# Patient Record
Sex: FEMALE | Race: WHITE | NOT HISPANIC OR LATINO | Employment: FULL TIME | URBAN - METROPOLITAN AREA
[De-identification: names, ages, dates, MRNs, and addresses within clinical notes are randomized per-mention and may not be internally consistent; named-entity substitution may affect disease eponyms.]

---

## 2017-04-22 ENCOUNTER — HOSPITAL ENCOUNTER (EMERGENCY)
Facility: CLINIC | Age: 27
Discharge: HOME | End: 2017-04-22
Attending: PHYSICIAN ASSISTANT | Admitting: PHYSICIAN ASSISTANT
Payer: COMMERCIAL

## 2017-04-22 ENCOUNTER — APPOINTMENT (OUTPATIENT)
Dept: GENERAL RADIOLOGY | Facility: CLINIC | Age: 27
End: 2017-04-22
Attending: PHYSICIAN ASSISTANT
Payer: COMMERCIAL

## 2017-04-22 VITALS
WEIGHT: 137 LBS | HEART RATE: 99 BPM | SYSTOLIC BLOOD PRESSURE: 110 MMHG | HEIGHT: 65 IN | RESPIRATION RATE: 18 BRPM | DIASTOLIC BLOOD PRESSURE: 70 MMHG | OXYGEN SATURATION: 97 % | BODY MASS INDEX: 22.82 KG/M2 | TEMPERATURE: 98.8 F

## 2017-04-22 DIAGNOSIS — S92.352A CLOSED DISPLACED FRACTURE OF FIFTH METATARSAL BONE OF LEFT FOOT, INITIAL ENCOUNTER: ICD-10-CM

## 2017-04-22 PROCEDURE — 73630 X-RAY EXAM OF FOOT: CPT | Mod: LT

## 2017-04-22 PROCEDURE — 99283 EMERGENCY DEPT VISIT LOW MDM: CPT

## 2017-04-22 PROCEDURE — 25000132 ZZH RX MED GY IP 250 OP 250 PS 637: Performed by: PHYSICIAN ASSISTANT

## 2017-04-22 RX ORDER — HYDROCODONE BITARTRATE AND ACETAMINOPHEN 5; 325 MG/1; MG/1
1-2 TABLET ORAL EVERY 4 HOURS PRN
Qty: 15 TABLET | Refills: 0 | Status: SHIPPED | OUTPATIENT
Start: 2017-04-22 | End: 2022-06-29

## 2017-04-22 RX ORDER — IBUPROFEN 600 MG/1
600 TABLET, FILM COATED ORAL ONCE
Status: COMPLETED | OUTPATIENT
Start: 2017-04-22 | End: 2017-04-22

## 2017-04-22 RX ADMIN — IBUPROFEN 600 MG: 600 TABLET ORAL at 17:02

## 2017-04-22 ASSESSMENT — ENCOUNTER SYMPTOMS: WOUND: 0

## 2017-04-22 NOTE — ED AVS SNAPSHOT
[facility] Emergency Department    [address]    [facility] [address]    Phone:  [phone]    Fax:  [phone]                                       Nicole Horn   MRN: [id number]    Department:  [facility] Emergency Department   Date of Visit:  4/22/2017           After Visit Summary Signature Page     I have received my discharge instructions, and my questions have been answered. I have discussed any challenges I see with this plan with the nurse or doctor.    ..........................................................................................................................................  Patient/Patient Representative Signature      ..........................................................................................................................................  Patient Representative Print Name and Relationship to Patient    ..................................................               ................................................  Date                                            Time    ..........................................................................................................................................  Reviewed by Signature/Title    ...................................................              ..............................................  Date                                                            Time

## 2017-04-22 NOTE — ED PROVIDER NOTES
"  History     Chief Complaint:  Foot Pain      HPI   Nicole Horn is a 26 year old female who presents with foot pain. The patient states yesterday she was painting when she stepped down on her left foot and it rolled. She has had pain in the lateral foot since but has been able to ambulate. She denies other injuries.     Allergies:  Augmentin-rash    Medications:    No medications    Past Medical History:    No past medical history on file.    Past Surgical History:    No past surgical history on file.    Family History:    No family history on file.    Social History:  Patient presents with her children  Marital Status:  Single [1]     Review of Systems   Musculoskeletal:        Left foot pain.   Skin: Negative for wound.   All other systems reviewed and are negative.    Physical Exam   First Vitals:  BP: 110/70  Pulse: 99  Temp: 98.8  F (37.1  C)  Resp: 18  Height: 165.1 cm (5' 5\")  Weight: 62.1 kg (137 lb)  SpO2: 97 %    Physical Exam  Constitutional: Alert, attentive  CV: 2+ DP and PT pulses, brisk distal cap refill  MSK: There is swelling and bruising over the dorsal aspect at the 5th metatarsal with tenderness. No pain with palpation or ROM of the left ankle, knee. No lower leg pain.   Neurological: 5/5 strength to the DF, PF, EHL and FHL motor functions; sensation intact to the DP, SP, T, S and S distributions  Skin: Skin is warm and dry.     Emergency Department Course     Imaging:  Radiographic findings were communicated with the patient who voiced understanding of the findings.  Left foot XR: There is a minimally displaced fracture of the proximal metaphysis of the left fifth metatarsal. No other fractures, per radiology.     Interventions:  Ibuprofen 600 mg PO    Emergency Department Course:  I evaluated the patient and discussed a plan of care with the patient.   Rechecked the patient, findings and plan explained to the patient. Patient discharged home, status improved, with instructions regarding " supportive care, medications, and reasons to return as well as the importance of close follow-up was reviewed.     Impression & Plan      Medical Decision Making:  Nicole Horn is a 26 year old female who presents for evaluation of left foot pain after rolling ankle last night. CMS is intact distally in the extremity.  Xrays reveal a fracture that does not need reduction at this time. They understand that this need for reduction and/or surgery may change with time and orthopedic consultation.  There is no indication for ortho consultation from the ED. Rather, close follow-up is indicated in the next days.  CAM boot and fracture precautions for home.  No other injuries.     This is a non-operative injury and unless further angulation with time or unrelenting pain, definitive fracture care is provided.  Patient was educated on natural course of this fracture, provided pain medication, need for increasing gentle activity (walking around block, increase distance each day). They need to follow up with primary only and only if further symptoms or progressive symptoms will need to seek care again in ED or with specialist.        Diagnosis:    ICD-10-CM    1. Closed displaced fracture of fifth metatarsal bone of left foot, initial encounter S92.352A        Disposition:  discharged to home    Discharge Medications:  New Prescriptions    HYDROCODONE-ACETAMINOPHEN (NORCO) 5-325 MG PER TABLET    Take 1-2 tablets by mouth every 4 hours as needed for moderate to severe pain       Sariah Gayle  4/22/2017   [facility] EMERGENCY DEPARTMENT       Sariah Gayle PA-C  04/22/17 1813

## 2017-04-22 NOTE — ED AVS SNAPSHOT
[facility] Emergency Department    [address]    [facility] [address]    Phone:  [phone]    Fax:  [phone]                                       Nicole Horn   MRN: [id number]    Department:  [facility] Emergency Department   Date of Visit:  4/22/2017           Patient Information     Date Of Birth          1990        Your diagnoses for this visit were:     Closed displaced fracture of fifth metatarsal bone of left foot, initial encounter        You were seen by Sariah Gayle PA-C.      Follow-up Information     Schedule an appointment as [facility] as [facility]    Contact information:    [address]  [address] [place] [address] [phone]        Follow up with [facility] Emergency Department.    Specialty:  EMERGENCY MEDICINE    Why:  If symptoms worsen    Contact information:    [address] [facility] [address] [phone]        Follow up with Clinic, Zainab Fried/Aleida In 2 days.    Why:  As needed    Contact information:    [address] [place] [address]  [phone]          Discharge Instructions         Foot Fracture  You have a broken bone (fracture) in your foot. This will cause pain, swelling, and sometimes bruising. It will take about 4 to 6 weeks to heal. A foot fracture may be treated with a special shoe, splint, cast, or boot.  Home care  Follow these guidelines when caring for yourself at home:    You may be given a splint, cast, shoe, or boot to keep the injured area from moving. Unless you were told otherwise, use crutches or a walker. Don t put weight on the injured foot until your health care provider says you can do so. (You can rent crutches and a walker at many pharmacies and surgical or orthopedic supply stores.) Don t put weight on a splint, or it will break.    Keep your leg elevated to reduce pain  and swelling. When sleeping, put a pillow under the injured leg. When sitting, support the injured leg so it is level with your waist. This is very important during the first 2 days (48 hours).    Put an ice pack on the injured area. Do this for 20 minutes every 1 to 2 hours the first day for pain relief. You can make an ice pack by wrapping a plastic bag of ice cubes in a thin towel. As the ice melts, be careful that the splint/cast/boot/shoe doesn t get wet. You can place the ice pack directly over the splint or cast. Unless told otherwise, you can open the boot or shoe to apply the ice pack. Continue using the ice pack 3 to 4 times a day for the next 2 days. Then use the ice pack as needed to ease pain and swelling.    Keep the splint/cast/boot/shoe dry. When bathing, protect it with a large plastic bag, rubber-banded at the top end. If a fiberglass splint or cast or boot gets wet, you can dry it with a hair dryer. Unless told otherwise, you can take off the boot or shoe to bathe.    You may use acetaminophen or ibuprofen to control pain, unless another pain medicine was prescribed. If you have chronic liver or kidney disease, talk with your health care provider before using these medicines. Also talk with your provider if you ve had a stomach ulcer or GI bleeding.    Don t put creams or objects under the cast if you have itching.  Follow-up care  Follow up with your health care provider within 1 week, or as advised. This is to make sure the bone is healing the way it should. If you were given a splint, it may be changed to a cast or boot at your follow-up visit.  If X-rays were taken, a radiologist will look at them. You will be told of any new findings that may affect your care.  When to seek medical advice  Call your health care provider right away if any of these occur:    The cast cracks    The plaster cast or splint becomes wet or soft    The fiberglass cast or splint stays wet for more than 24 hours    Bad  odor from the cast or wound fluid stains the cast    Tightness or pain under the cast or splint gets worse    Toes become swollen, cold, blue, numb, or tingly    You can t move your toes    Skin around cast becomes red    Fever of 101 F (38.3 C) or higher, or as directed by your health care provider    7642-3103 [employer]. [address] [place] [address]. All rights reserved. This information is not intended as a substitute for professional medical care. Always follow your healthcare professional's instructions.          24 Hour Appointment Hotline       To make an appointment at any [facility], call [phone] ([phone]). If you don't have a family doctor or clinic, we will help you find one. [facility] clinics are conveniently located to serve the needs of you and your family.             Review of your medicines      START taking        Dose / Directions Last dose taken    HYDROcodone-acetaminophen 5-325 MG per tablet   Commonly known as:  NORCO   Dose:  1-2 tablet   Quantity:  15 tablet        Take 1-2 tablets by mouth every 4 hours as needed for moderate to severe pain   Refills:  0                Prescriptions were sent or printed at these locations (1 Prescription)                   Other Prescriptions                Printed at Department/Unit printer (1 of 1)         HYDROcodone-acetaminophen (NORCO) 5-325 MG per tablet                Procedures and tests performed during your visit     Foot XR, G/E 3 views, left      Orders Needing Specimen Collection     None      Pending Results     Date and Time Order Name Status Description    4/22/2017 1653 Foot XR, G/E 3 views, left Preliminary             Pending Culture Results     No orders found from 4/20/2017 to 4/23/2017.            Test Results From Your Hospital Stay        4/22/2017  5:34 PM      Narrative     FOOT LEFT THREE OR MORE VIEWS 4/22/2017 5:23 PM     COMPARISON: None.    HISTORY: Bruising and swelling over 5th  metatarsal, rolled ankle.        Impression     IMPRESSION: There is a minimally displaced fracture of the proximal  metaphysis of the left fifth metatarsal. No other fractures.                Clinical Quality Measure: Blood Pressure Screening     Your blood pressure was checked while you were in the emergency department today. The last reading we obtained was  BP: 110/70 . Please read the guidelines below about what these numbers mean and what you should do about them.  If your systolic blood pressure (the top number) is less than 120 and your diastolic blood pressure (the bottom number) is less than 80, then your blood pressure is normal. There is nothing more that you need to do about it.  If your systolic blood pressure (the top number) is 120-139 or your diastolic blood pressure (the bottom number) is 80-89, your blood pressure may be higher than it should be. You should have your blood pressure rechecked within a year by a primary care provider.  If your systolic blood pressure (the top number) is 140 or greater or your diastolic blood pressure (the bottom number) is 90 or greater, you may have high blood pressure. High blood pressure is treatable, but if left untreated over time it can put you at risk for heart attack, stroke, or kidney failure. You should have your blood pressure rechecked by a primary care provider within the next 4 weeks.  If your provider in the emergency department today gave you specific instructions to follow-up with your doctor or provider even sooner than that, you should follow that instruction and not wait for up to 4 weeks for your follow-up visit.        Thank you for choosing [facility]       Thank you for choosing [facility] for your care. Our goal is always to provide you with excellent care. Hearing back from our patients is one way we can continue to improve our services. Please take a few minutes to complete the written survey that you may receive in the mail after you visit  "with us. Thank you!        [employer] Information     [employer] lets you send messages to your doctor, view your test results, renew your prescriptions, schedule appointments and more. To sign up, go to [email or web address] . Click on \"Log in\" on the left side of the screen, which will take you to the Welcome page. Then click on \"Sign up Now\" on the right side of the page.     You will be asked to enter the access code listed below, as well as some personal information. Please follow the directions to create your username and password.     Your access code is: [email or web address]  Expires: 2017  5:41 PM     Your access code will  in 90 days. If you need help or a new code, please call your [facility] clinic or [phone].        Care EveryWhere ID     This is your Care EveryWhere ID. This could be used by other organizations to access your [facility] medical records  [id number]        After Visit Summary       This is your record. Keep this with you and show to your community pharmacist(s) and doctor(s) at your next visit.                  "

## 2017-04-22 NOTE — PROGRESS NOTES
04/22/17 1707   Child Life   Location ED   Intervention Family Support   Family Support Comment support pt's children   Outcomes/Follow Up Continue to Follow/Support   Introduced self and service. Provided support and activities for pt's three children during her ED stay to help them keep busy.

## 2017-04-22 NOTE — DISCHARGE INSTRUCTIONS
Foot Fracture  You have a broken bone (fracture) in your foot. This will cause pain, swelling, and sometimes bruising. It will take about 4 to 6 weeks to heal. A foot fracture may be treated with a special shoe, splint, cast, or boot.  Home care  Follow these guidelines when caring for yourself at home:    You may be given a splint, cast, shoe, or boot to keep the injured area from moving. Unless you were told otherwise, use crutches or a walker. Don t put weight on the injured foot until your health care provider says you can do so. (You can rent crutches and a walker at many pharmacies and surgical or orthopedic supply stores.) Don t put weight on a splint, or it will break.    Keep your leg elevated to reduce pain and swelling. When sleeping, put a pillow under the injured leg. When sitting, support the injured leg so it is level with your waist. This is very important during the first 2 days (48 hours).    Put an ice pack on the injured area. Do this for 20 minutes every 1 to 2 hours the first day for pain relief. You can make an ice pack by wrapping a plastic bag of ice cubes in a thin towel. As the ice melts, be careful that the splint/cast/boot/shoe doesn t get wet. You can place the ice pack directly over the splint or cast. Unless told otherwise, you can open the boot or shoe to apply the ice pack. Continue using the ice pack 3 to 4 times a day for the next 2 days. Then use the ice pack as needed to ease pain and swelling.    Keep the splint/cast/boot/shoe dry. When bathing, protect it with a large plastic bag, rubber-banded at the top end. If a fiberglass splint or cast or boot gets wet, you can dry it with a hair dryer. Unless told otherwise, you can take off the boot or shoe to bathe.    You may use acetaminophen or ibuprofen to control pain, unless another pain medicine was prescribed. If you have chronic liver or kidney disease, talk with your health care provider before using these medicines. Also  talk with your provider if you ve had a stomach ulcer or GI bleeding.    Don t put creams or objects under the cast if you have itching.  Follow-up care  Follow up with your health care provider within 1 week, or as advised. This is to make sure the bone is healing the way it should. If you were given a splint, it may be changed to a cast or boot at your follow-up visit.  If X-rays were taken, a radiologist will look at them. You will be told of any new findings that may affect your care.  When to seek medical advice  Call your health care provider right away if any of these occur:    The cast cracks    The plaster cast or splint becomes wet or soft    The fiberglass cast or splint stays wet for more than 24 hours    Bad odor from the cast or wound fluid stains the cast    Tightness or pain under the cast or splint gets worse    Toes become swollen, cold, blue, numb, or tingly    You can t move your toes    Skin around cast becomes red    Fever of 101 F (38.3 C) or higher, or as directed by your health care provider    4767-4984 [employer]. [address] [place] [address]. All rights reserved. This information is not intended as a substitute for professional medical care. Always follow your healthcare professional's instructions.

## 2022-06-22 ENCOUNTER — TELEPHONE (OUTPATIENT)
Dept: FAMILY MEDICINE | Facility: CLINIC | Age: 32
End: 2022-06-22

## 2022-06-23 NOTE — TELEPHONE ENCOUNTER
Spoke with pt. She is going to have her mom call back today (6/23) and resched. for her.   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

## 2022-06-29 ENCOUNTER — OFFICE VISIT (OUTPATIENT)
Dept: FAMILY MEDICINE | Facility: CLINIC | Age: 32
End: 2022-06-29
Payer: COMMERCIAL

## 2022-06-29 VITALS
HEIGHT: 65 IN | SYSTOLIC BLOOD PRESSURE: 124 MMHG | BODY MASS INDEX: 31.65 KG/M2 | WEIGHT: 190 LBS | OXYGEN SATURATION: 97 % | RESPIRATION RATE: 20 BRPM | DIASTOLIC BLOOD PRESSURE: 72 MMHG | HEART RATE: 90 BPM

## 2022-06-29 DIAGNOSIS — Z11.4 SCREENING FOR HIV (HUMAN IMMUNODEFICIENCY VIRUS): ICD-10-CM

## 2022-06-29 DIAGNOSIS — Z11.59 NEED FOR HEPATITIS C SCREENING TEST: ICD-10-CM

## 2022-06-29 DIAGNOSIS — E06.3 HYPOTHYROIDISM DUE TO HASHIMOTO'S THYROIDITIS: Primary | ICD-10-CM

## 2022-06-29 DIAGNOSIS — B36.0 TINEA VERSICOLOR: ICD-10-CM

## 2022-06-29 DIAGNOSIS — Z13.1 SCREENING FOR DIABETES MELLITUS: ICD-10-CM

## 2022-06-29 LAB
FASTING STATUS PATIENT QL REPORTED: NORMAL
GLUCOSE SERPL-MCNC: 90 MG/DL (ref 70–99)
TSH SERPL DL<=0.005 MIU/L-ACNC: 3.45 UIU/ML (ref 0.3–4.2)

## 2022-06-29 PROCEDURE — 36415 COLL VENOUS BLD VENIPUNCTURE: CPT | Performed by: NURSE PRACTITIONER

## 2022-06-29 PROCEDURE — 99204 OFFICE O/P NEW MOD 45 MIN: CPT | Performed by: NURSE PRACTITIONER

## 2022-06-29 PROCEDURE — 84443 ASSAY THYROID STIM HORMONE: CPT | Performed by: NURSE PRACTITIONER

## 2022-06-29 PROCEDURE — 86803 HEPATITIS C AB TEST: CPT | Performed by: NURSE PRACTITIONER

## 2022-06-29 PROCEDURE — 82947 ASSAY GLUCOSE BLOOD QUANT: CPT | Performed by: NURSE PRACTITIONER

## 2022-06-29 PROCEDURE — 87389 HIV-1 AG W/HIV-1&-2 AB AG IA: CPT | Performed by: NURSE PRACTITIONER

## 2022-06-29 RX ORDER — KETOCONAZOLE 20 MG/G
CREAM TOPICAL DAILY
Qty: 30 G | Refills: 0 | Status: SHIPPED | OUTPATIENT
Start: 2022-06-29

## 2022-06-29 RX ORDER — FLUCONAZOLE 150 MG/1
300 TABLET ORAL WEEKLY
Qty: 6 TABLET | Refills: 0 | Status: SHIPPED | OUTPATIENT
Start: 2022-06-29 | End: 2022-07-14

## 2022-06-29 RX ORDER — LEVOTHYROXINE SODIUM 50 UG/1
TABLET ORAL
Qty: 90 TABLET | Refills: 0 | Status: SHIPPED | OUTPATIENT
Start: 2022-06-29 | End: 2022-07-01

## 2022-06-29 RX ORDER — LEVOTHYROXINE SODIUM 50 UG/1
TABLET ORAL
COMMUNITY
Start: 2022-02-23 | End: 2022-06-29

## 2022-06-29 ASSESSMENT — PATIENT HEALTH QUESTIONNAIRE - PHQ9
SUM OF ALL RESPONSES TO PHQ QUESTIONS 1-9: 3
10. IF YOU CHECKED OFF ANY PROBLEMS, HOW DIFFICULT HAVE THESE PROBLEMS MADE IT FOR YOU TO DO YOUR WORK, TAKE CARE OF THINGS AT HOME, OR GET ALONG WITH OTHER PEOPLE: SOMEWHAT DIFFICULT
SUM OF ALL RESPONSES TO PHQ QUESTIONS 1-9: 3

## 2022-06-29 ASSESSMENT — PAIN SCALES - GENERAL: PAINLEVEL: NO PAIN (0)

## 2022-06-29 NOTE — PATIENT INSTRUCTIONS
I did send 90 days of the levothyroxine to your pharmacy to make sure that you can start taking your own prescription.    We will also update your thyroid levels today to make sure you are on an effective dose.  Try to take at the same time every day on an empty stomach.    The rash looks like tinea versicolor.  This can be typically treated with oral antifungal medications.    I sent fluconazole to your pharmacy.  Take 2 tablets once a week for the next 3 weeks.    I also sent a prescription cream called ketoconazole that you can use to spot treat areas if this flares up again.    I will find someone who can get your Nexplanon switched out.

## 2022-06-29 NOTE — LETTER
July 1, 2022      Nicole Horn  [address] [place] [address]        Dear ,    We are writing to inform you of your test results.    Thyroid levels are looking good so I made sure to get refills of your thyroid medication sent to the pharmacy.  Screening blood sugar is normal and once in a lifetime low risk screening for HIV and hepatitis C are negative.    Resulted Orders   HIV Antigen Antibody Combo   Result Value Ref Range    HIV Antigen Antibody Combo Nonreactive Nonreactive      Comment:      HIV-1 p24 Ag & HIV-1/HIV-2 Ab Not Detected   Hepatitis C Screen Reflex to HCV RNA Quant and Genotype   Result Value Ref Range    Hepatitis C Antibody Nonreactive Nonreactive    Narrative    Assay performance characteristics have not been established for newborns, infants, and children.   TSH with free T4 reflex   Result Value Ref Range    TSH 3.45 0.30 - 4.20 uIU/mL   Glucose   Result Value Ref Range    Glucose 90 70 - 99 mg/dL    Patient Fasting > 8hrs? Unknown        If you have any questions or concerns, please call the clinic at the number listed above.       Sincerely,      Blake Gant NP

## 2022-06-29 NOTE — PROGRESS NOTES
"Assessment & Plan     ICD-10-CM    1. Screening for HIV (human immunodeficiency virus)  Z11.4    2. Need for hepatitis C screening test  Z11.59      Reviewed FNA pathology, TSH, T4 free, lipids    Refill of levothyroxine sent to the pharmacy.  Update thyroid labs.  Rash looks like tinea versicolor.  Treat with fluconazole weekly.  Ketoconazole to hold onto for spot treatments.    Subjective     HPI     Hypothyroidism  Diagnosed and had been managed by endocrinology.  Has had a thyroid biopsy consistent with Hashimoto's.  She ran out of her medication so has been taking her mother's but it is the same dosage.  No hair thinning, dry skin, palpitations, tremors, sleep disturbances, temperature intolerance. Taking 50mcg for the past 4 months religiously.  Has been bad about taking it up until 4 months ago.      Rash  Hyperpigmentation under breast moving up chest. No pain. No itching. History of psoriasis.      Review of Systems - negative except for what's listed in the HPI      Objective    /72   Pulse 90   Resp 20   Ht 1.645 m (5' 4.75\")   Wt 86.2 kg (190 lb)   LMP 06/15/2022   SpO2 97%   Breastfeeding No   BMI 31.86 kg/m    Physical Exam   General appearance - alert, well appearing, and in no distress  Mental status - alert, oriented to person, place, and time  Neck-No mass  Lymphatics - no palpable lymphadenopathy  Chest - clear to auscultation, no wheezes, rales or rhonchi, symmetric air entry  Heart - normal rate and regular rhythm, S1 and S2 normal, no murmurs noted  Neurological - alert, oriented, normal speech, no focal findings or movement disorder noted, neck supple without rigidity, cranial nerves II through XII intact, motor and sensory grossly normal bilaterally, normal muscle tone, no tremors, strength 5/5  Extremities - peripheral pulses normal, no peripheral edema  Skin -scattered areas of hyperpigmentation with rounded edges.  Flat.  This extends under the left breast up towards the left " chest and neck.  No erythema.  No fluctuance.  No drainage.    Blake Gant, CNP    This note has been dictated using voice recognition software. Any grammatical or context distortions are unintentional and inherent to the software.

## 2022-06-30 ENCOUNTER — TELEPHONE (OUTPATIENT)
Dept: FAMILY MEDICINE | Facility: CLINIC | Age: 32
End: 2022-06-30

## 2022-06-30 LAB
HCV AB SERPL QL IA: NONREACTIVE
HIV 1+2 AB+HIV1 P24 AG SERPL QL IA: NONREACTIVE

## 2022-06-30 NOTE — TELEPHONE ENCOUNTER
Left message to call back for: Nicole  Information to relay to patient: Was having an issue getting procedure scheduled and called back as discussed. She had said between 12 and 2 would be the time. The soonest/ closest to that timing was 8/16/22 at 2:20. Asked her to call back if that doesn't work for her.

## 2022-07-01 RX ORDER — LEVOTHYROXINE SODIUM 50 UG/1
TABLET ORAL
Qty: 90 TABLET | Refills: 3 | Status: SHIPPED | OUTPATIENT
Start: 2022-07-01

## 2022-07-29 ENCOUNTER — VIRTUAL VISIT (OUTPATIENT)
Dept: FAMILY MEDICINE | Facility: CLINIC | Age: 32
End: 2022-07-29
Payer: COMMERCIAL

## 2022-07-29 DIAGNOSIS — Z97.5 NEXPLANON IN PLACE: ICD-10-CM

## 2022-07-29 DIAGNOSIS — Z30.09 BIRTH CONTROL COUNSELING: Primary | ICD-10-CM

## 2022-07-29 PROCEDURE — 99213 OFFICE O/P EST LOW 20 MIN: CPT | Mod: GT | Performed by: NURSE PRACTITIONER

## 2022-07-29 NOTE — PROGRESS NOTES
"Nicole is a 32 year old who is being evaluated via a billable video visit.      How would you like to obtain your AVS? MyChart  If the video visit is dropped, the invitation should be resent by: Text to cell phone: [phone]  Will anyone else be joining your video visit? No          Assessment & Plan     Birth control counseling    She is aware of all other options of birth control but she has been doing well with the Nexplanon in place so we will continue with this current form of contraception.    Nexplanon in place    Her device is due to  in September so we will get her scheduled for her procedure prior to expiration.  We did discuss risks and benefits related to the procedure and what to expect during the procedure in regards to removal with reinsertion.  All of her questions were addressed and she has no concerns regarding the upcoming appointment.         Nicotine/Tobacco Cessation:  She reports that she has been smoking cigarettes. She has never used smokeless tobacco.  Nicotine/Tobacco Cessation Plan:         BMI:   Estimated body mass index is 31.86 kg/m  as calculated from the following:    Height as of 22: 1.645 m (5' 4.75\").    Weight as of 22: 86.2 kg (190 lb).           Return in about 6 weeks (around 2022) for Follow up, with me, Nexplanon removal with reinsertion.    Inez Bain NP  [facility]   Nicole is a 32 year old, presenting for the following health issues: Discuss birth control  Consult (Nexplanon needs to be taken out in September, would like it replaced with another nexplanon. Sporadic light periods. Hasn't had sex in 1.5 year)      HPI     Currently has Nexplanon in place which is due to  2022. No current bleeding right now but overall, she is doing well with this form of BC. She is due to have the device exchanged since it does  2022. This will be her 2 nd device. She is coming just for " the procedure. She does not have a current PCP, she will need to establish care. Had 3 children currently.     Review of Systems   CONSTITUTIONAL: NEGATIVE for fever, chills, change in weight  ENT/MOUTH: NEGATIVE for ear, mouth and throat problems  RESP: NEGATIVE for significant cough or SOB  CV: NEGATIVE for chest pain, palpitations or peripheral edema      Objective           Vitals:  No vitals were obtained today due to virtual visit.    Physical Exam   GENERAL: Healthy, alert and no distress  EYES: Eyes grossly normal to inspection.  No discharge or erythema, or obvious scleral/conjunctival abnormalities.  RESP: No audible wheeze, cough, or visible cyanosis.  No visible retractions or increased work of breathing.    SKIN: Visible skin clear. No significant rash, abnormal pigmentation or lesions.  NEURO: Cranial nerves grossly intact.  Mentation and speech appropriate for age.  PSYCH: Mentation appears normal, affect normal/bright, judgement and insight intact, normal speech and appearance well-groomed.                Video-Visit Details    Video Start Time: 1240    Type of service:  Video Visit    Video End Time:1:00 PM    Originating Location (pt. Location): Home    Distant Location (provider location):  [facility]     Platform used for Video Visit: [employer]    .  ..

## 2022-08-15 ENCOUNTER — TELEPHONE (OUTPATIENT)
Dept: FAMILY MEDICINE | Facility: CLINIC | Age: 32
End: 2022-08-15

## 2022-08-15 ENCOUNTER — OFFICE VISIT (OUTPATIENT)
Dept: FAMILY MEDICINE | Facility: CLINIC | Age: 32
End: 2022-08-15
Payer: COMMERCIAL

## 2022-08-15 VITALS
DIASTOLIC BLOOD PRESSURE: 82 MMHG | SYSTOLIC BLOOD PRESSURE: 110 MMHG | HEIGHT: 65 IN | BODY MASS INDEX: 31.42 KG/M2 | RESPIRATION RATE: 16 BRPM | WEIGHT: 188.6 LBS | OXYGEN SATURATION: 95 % | HEART RATE: 97 BPM

## 2022-08-15 DIAGNOSIS — E03.9 HYPOTHYROIDISM, UNSPECIFIED TYPE: ICD-10-CM

## 2022-08-15 DIAGNOSIS — Z00.00 ROUTINE GENERAL MEDICAL EXAMINATION AT A HEALTH CARE FACILITY: Primary | ICD-10-CM

## 2022-08-15 LAB
ALBUMIN SERPL BCG-MCNC: 4.5 G/DL (ref 3.5–5.2)
ALP SERPL-CCNC: 74 U/L (ref 35–104)
ALT SERPL W P-5'-P-CCNC: 21 U/L (ref 10–35)
ANION GAP SERPL CALCULATED.3IONS-SCNC: 11 MMOL/L (ref 7–15)
AST SERPL W P-5'-P-CCNC: 18 U/L (ref 10–35)
BILIRUB SERPL-MCNC: 0.7 MG/DL
BUN SERPL-MCNC: 8.6 MG/DL (ref 6–20)
CALCIUM SERPL-MCNC: 9.2 MG/DL (ref 8.6–10)
CHLORIDE SERPL-SCNC: 104 MMOL/L (ref 98–107)
CHOLEST SERPL-MCNC: 168 MG/DL
CREAT SERPL-MCNC: 0.73 MG/DL (ref 0.51–0.95)
DEPRECATED HCO3 PLAS-SCNC: 25 MMOL/L (ref 22–29)
ERYTHROCYTE [DISTWIDTH] IN BLOOD BY AUTOMATED COUNT: 12.1 % (ref 10–15)
GFR SERPL CREATININE-BSD FRML MDRD: >90 ML/MIN/1.73M2
GLUCOSE SERPL-MCNC: 80 MG/DL (ref 70–99)
HCT VFR BLD AUTO: 42 % (ref 35–47)
HDLC SERPL-MCNC: 41 MG/DL
HGB BLD-MCNC: 13.6 G/DL (ref 11.7–15.7)
LDLC SERPL CALC-MCNC: 95 MG/DL
MCH RBC QN AUTO: 29.1 PG (ref 26.5–33)
MCHC RBC AUTO-ENTMCNC: 32.4 G/DL (ref 31.5–36.5)
MCV RBC AUTO: 90 FL (ref 78–100)
NONHDLC SERPL-MCNC: 127 MG/DL
PLATELET # BLD AUTO: 247 10E3/UL (ref 150–450)
POTASSIUM SERPL-SCNC: 4.5 MMOL/L (ref 3.4–5.3)
PROT SERPL-MCNC: 7.2 G/DL (ref 6.4–8.3)
RBC # BLD AUTO: 4.68 10E6/UL (ref 3.8–5.2)
SODIUM SERPL-SCNC: 140 MMOL/L (ref 136–145)
TRIGL SERPL-MCNC: 159 MG/DL
WBC # BLD AUTO: 10.4 10E3/UL (ref 4–11)

## 2022-08-15 PROCEDURE — 85027 COMPLETE CBC AUTOMATED: CPT | Performed by: FAMILY MEDICINE

## 2022-08-15 PROCEDURE — 80053 COMPREHEN METABOLIC PANEL: CPT | Performed by: FAMILY MEDICINE

## 2022-08-15 PROCEDURE — 80061 LIPID PANEL: CPT | Performed by: FAMILY MEDICINE

## 2022-08-15 PROCEDURE — 99395 PREV VISIT EST AGE 18-39: CPT | Performed by: FAMILY MEDICINE

## 2022-08-15 PROCEDURE — 36415 COLL VENOUS BLD VENIPUNCTURE: CPT | Performed by: FAMILY MEDICINE

## 2022-08-15 ASSESSMENT — ENCOUNTER SYMPTOMS
NAUSEA: 0
PALPITATIONS: 0
ABDOMINAL PAIN: 0
BREAST MASS: 0
EYE PAIN: 0
CONSTIPATION: 0
SORE THROAT: 0
WEAKNESS: 0
HEMATOCHEZIA: 0
DIZZINESS: 0
SHORTNESS OF BREATH: 0
HEADACHES: 0
PARESTHESIAS: 0
CHILLS: 0
MYALGIAS: 0
COUGH: 0
ARTHRALGIAS: 0
DIARRHEA: 0
NERVOUS/ANXIOUS: 0
JOINT SWELLING: 0
DYSURIA: 0
FREQUENCY: 0
FEVER: 0
HEARTBURN: 0
HEMATURIA: 0

## 2022-08-15 NOTE — PROGRESS NOTES
SUBJECTIVE:   CC: Nicole Horn is an 32 year old woman who presents for preventive health visit.       Patient has been advised of split billing requirements and indicates understanding: Yes  Healthy Habits:   PHQ-2 Total Score: 0          Hypothyroidism Follow-up      Since last visit, patient describes the following symptoms: Weight stable, no hair loss, no skin changes, no constipation, no loose stools      Today's PHQ-2 Score:   PHQ-2 ( 1999 Pfizer) 8/15/2022   Q1: Little interest or pleasure in doing things 0   Q2: Feeling down, depressed or hopeless 0   PHQ-2 Score 0   Q1: Little interest or pleasure in doing things Not at all   Q2: Feeling down, depressed or hopeless Not at all   PHQ-2 Score 0       Abuse: Current or Past (Physical, Sexual or Emotional) - No  Do you feel safe in your environment? Yes        Social History     Tobacco Use     Smoking status: Current Some Day Smoker     Types: Cigarettes     Smokeless tobacco: Never Used   Substance Use Topics     Alcohol use: Not Currently     Alcohol/week: 0.0 - 4.0 standard drinks     If you drink alcohol do you typically have >3 drinks per day or >7 drinks per week? No    Alcohol Use 8/15/2022   Prescreen: >3 drinks/day or >7 drinks/week? No       Reviewed orders with patient.  Reviewed health maintenance and updated orders accordingly - Yes  Lab work is in process  Labs reviewed in EPIC    Breast Cancer Screening:    Breast CA Risk Assessment (FHS-7) 8/15/2022   Do you have a family history of breast, colon, or ovarian cancer? No / Unknown         Patient under 40 years of age: Routine Mammogram Screening not recommended.   Pertinent mammograms are reviewed under the imaging tab.    History of abnormal Pap smear: NO - age 30-65 PAP every 5 years with negative HPV co-testing recommended. Pt declined pap smear today.      Reviewed and updated as needed this visit by clinical staff   Tobacco  Allergies  Meds  Problems  Med Hx  Surg Hx  Fam Hx    "         Reviewed and updated as needed this visit by Provider   Tobacco  Allergies  Meds  Problems  Med Hx  Surg Hx  Fam Hx           History reviewed. No pertinent past medical history.   Past Surgical History:   Procedure Laterality Date     UMBILICAL HERNIA         Review of Systems  CONSTITUTIONAL: NEGATIVE for fever, chills, change in weight  INTEGUMENTARU/SKIN: NEGATIVE for worrisome rashes, moles or lesions  EYES: NEGATIVE for vision changes or irritation  ENT: NEGATIVE for ear, mouth and throat problems  RESP: NEGATIVE for significant cough or SOB  BREAST: NEGATIVE for masses, tenderness or discharge  CV: NEGATIVE for chest pain, palpitations or peripheral edema  GI: NEGATIVE for nausea, abdominal pain, heartburn, or change in bowel habits  : NEGATIVE for unusual urinary or vaginal symptoms. Periods are regular.  MUSCULOSKELETAL: NEGATIVE for significant arthralgias or myalgia  NEURO: NEGATIVE for weakness, dizziness or paresthesias  PSYCHIATRIC: NEGATIVE for changes in mood or affect     OBJECTIVE:   /82 (BP Location: Right arm, Patient Position: Sitting, Cuff Size: Adult Regular)   Pulse 97   Resp 16   Ht 1.645 m (5' 4.75\")   Wt 85.5 kg (188 lb 9.6 oz)   LMP 07/15/2022   SpO2 (!) 85%   BMI 31.63 kg/m    Physical Exam  GENERAL: healthy, alert and no distress  EYES: Eyes grossly normal to inspection, PERRL and conjunctivae and sclerae normal  HENT: ear canals and TM's normal, nose and mouth without ulcers or lesions  NECK: no adenopathy, no asymmetry, masses, or scars and thyroid normal to palpation  RESP: lungs clear to auscultation - no rales, rhonchi or wheezes  BREAST: normal without masses, tenderness or nipple discharge and no palpable axillary masses or adenopathy  CV: regular rate and rhythm, normal S1 S2, no S3 or S4, no murmur, click or rub, no peripheral edema and peripheral pulses strong  ABDOMEN: soft, nontender, no hepatosplenomegaly, no masses and bowel sounds " "normal  MS: no gross musculoskeletal defects noted, no edema  SKIN: no suspicious lesions or rashes  NEURO: Normal strength and tone, mentation intact and speech normal  PSYCH: mentation appears normal, affect normal/bright    Diagnostic Test Results:  Labs reviewed in Epic    ASSESSMENT/PLAN:   (Z00.00) Routine general medical examination at a health care facility  (primary encounter diagnosis)  Comment: encourage annual pe and vaccine up to date. Advise to have covid booste  Plan: Lipid panel, Comprehensive metabolic panel (BMP        + Alb, Alk Phos, ALT, AST, Total. Bili, TP),         CBC with platelets        Pt declined covid booster. She declined pap smear today    (E03.9) Hypothyroidism, unspecified type  Comment: stable   Plan: continue current plan     Patient has been advised of split billing requirements and indicates understanding: Yes    COUNSELING:  Reviewed preventive health counseling, as reflected in patient instructions       Regular exercise       Healthy diet/nutrition       Vision screening       Osteoporosis prevention/bone health    Estimated body mass index is 31.63 kg/m  as calculated from the following:    Height as of this encounter: 1.645 m (5' 4.75\").    Weight as of this encounter: 85.5 kg (188 lb 9.6 oz).    Weight management plan: Discussed healthy diet and exercise guidelines    She reports that she has been smoking cigarettes. She has never used smokeless tobacco.  Nicotine/Tobacco Cessation Plan:   Information offered: Patient not interested at this time      Counseling Resources:  ATP IV Guidelines  Pooled Cohorts Equation Calculator  Breast Cancer Risk Calculator  BRCA-Related Cancer Risk Assessment: FHS-7 Tool  FRAX Risk Assessment  ICSI Preventive Guidelines  Dietary Guidelines for Americans, 2010  USDA's MyPlate  ASA Prophylaxis  Lung CA Screening    Pam Quijano MD  [facility]  "

## 2022-08-15 NOTE — TELEPHONE ENCOUNTER
Forms Request  Name of form/paperwork: immunization form signature for physical  Have you been seen for this request: N/A  Do we have the form: yes, in providers inbox  When is form needed by: when done  How would you like the form returned: call to ; mom may , on consent to communicate-mom is okay to   Patient Notified form requests are processed in 3-5 business days: yes    Okay to leave a detailed message? yes

## 2022-08-15 NOTE — LETTER
August 15, 2022      Nicole Horn  [address] [address]        Dear ,    We are writing to inform you of your test results.    I have reviewed all lab results which are normal or satisfied.     Normal liver and kidney function. Normal blood glucose and electrolytes. Normal blood cell count.     Mild elevated triglycerides with mile low HDL that is good cholesterol. Advise regular exercise and low fat diet. Will monitor annually.     Pam Quijano MD on 8/15/2022 at 4:44 PM       Resulted Orders   Lipid panel   Result Value Ref Range    Cholesterol 168 <200 mg/dL    Triglycerides 159 (H) <150 mg/dL    Direct Measure HDL 41 (L) >=50 mg/dL    LDL Cholesterol Calculated 95 <=100 mg/dL    Non HDL Cholesterol 127 <130 mg/dL    Narrative    Cholesterol  Desirable:  <200 mg/dL    Triglycerides  Normal:  Less than 150 mg/dL  Borderline High:  150-199 mg/dL  High:  200-499 mg/dL  Very High:  Greater than or equal to 500 mg/dL    Direct Measure HDL  Female:  Greater than or equal to 50 mg/dL   Male:  Greater than or equal to 40 mg/dL    LDL Cholesterol  Desirable:  <100mg/dL  Above Desirable:  100-129 mg/dL   Borderline High:  130-159 mg/dL   High:  160-189 mg/dL   Very High:  >= 190 mg/dL    Non HDL Cholesterol  Desirable:  130 mg/dL  Above Desirable:  130-159 mg/dL  Borderline High:  160-189 mg/dL  High:  190-219 mg/dL  Very High:  Greater than or equal to 220 mg/dL   Comprehensive metabolic panel (BMP + Alb, Alk Phos, ALT, AST, Total. Bili, TP)   Result Value Ref Range    Sodium 140 136 - 145 mmol/L    Potassium 4.5 3.4 - 5.3 mmol/L    Creatinine 0.73 0.51 - 0.95 mg/dL    Urea Nitrogen 8.6 6.0 - 20.0 mg/dL    Chloride 104 98 - 107 mmol/L    Carbon Dioxide (CO2) 25 22 - 29 mmol/L    Anion Gap 11 7 - 15 mmol/L    Glucose 80 70 - 99 mg/dL    Calcium 9.2 8.6 - 10.0 mg/dL    Protein Total 7.2 6.4 - 8.3 g/dL    Albumin 4.5 3.5 - 5.2 g/dL    Bilirubin Total 0.7 <=1.2 mg/dL    Alkaline Phosphatase 74 35 -  104 U/L    AST 18 10 - 35 U/L    ALT 21 10 - 35 U/L    GFR Estimate >90 >60 mL/min/1.73m2      Comment:      Effective December 21, 2021 eGFRcr in adults is calculated using the 2021 CKD-EPI creatinine equation which includes age and gender (Genoveva et al., NE, DOI: 10.1056/BZQSvu4557473)   CBC with platelets   Result Value Ref Range    WBC Count 10.4 4.0 - 11.0 10e3/uL    RBC Count 4.68 3.80 - 5.20 10e6/uL    Hemoglobin 13.6 11.7 - 15.7 g/dL    Hematocrit 42.0 35.0 - 47.0 %    MCV 90 78 - 100 fL    MCH 29.1 26.5 - 33.0 pg    MCHC 32.4 31.5 - 36.5 g/dL    RDW 12.1 10.0 - 15.0 %    Platelet Count 247 150 - 450 10e3/uL       If you have any questions or concerns, please call the clinic at the number listed above.       Sincerely,      Pam Quijano MD

## 2022-08-15 NOTE — TELEPHONE ENCOUNTER
Called to inform pt that her forms have been completed and are ready to be picked up. Pt states she will  some time today.

## 2022-11-15 ENCOUNTER — NURSE TRIAGE (OUTPATIENT)
Dept: NURSING | Facility: CLINIC | Age: 32
End: 2022-11-15

## 2022-11-15 NOTE — TELEPHONE ENCOUNTER
Nurse Triage SBAR    Situation:   -Hand injury    Background:   -Patient calling, It is okay to leave a detailed message at this number.     Assessment:   -She think she has broke her hand  -Last night she tripped and may have hit her hand on the door frame  -No bleeding and no open skin  -Pain 5/10 at rest , with activity pain is 10/10    -She is concerned she has Bacterial vaginosis  (declined further triage at this time, she would just like to know appointment availability      Recommendation:   See in office today or tomorrow  -Warm transferred to scheduling to check availability  on patient request, if nothing is available  please go to .  -Call back with any additional questions or concerns  -Patient declined care advice    AYALA HAMM RN on 11/15/2022 at 4:43 PM      Reason for Disposition    Injury interferes with work or school    Additional Information    Negative: Major bleeding (actively dripping or spurting) that can't be stopped    Negative: Amputation or bone sticking through the skin    Negative: Serious injury with multiple fractures (broken bones)    Negative: Sounds like a life-threatening emergency to the triager    Negative: Finger injury is main concern    Negative: Caused by an animal bite    Negative: Wound looks infected    Negative: Cast problems or questions    Negative: Bullet, stabbed by knife or other serious penetrating wound    Negative: High pressure injection injury (e.g., from paint gun, usually work-related)    Negative: Looks like a broken bone or dislocated joint (crooked or deformed)    Negative: Skin is split open or gaping (length > 1/2 inch or 12 mm)    Negative: Bleeding won't stop after 10 minutes of direct pressure (using correct technique)    Negative: Dirt in the wound and not removed after 15 minutes of scrubbing    Negative: Numbness (loss of sensation) of finger(s)    Negative: Sounds like a serious injury to the triager    Negative: Looks infected (e.g.,  spreading redness, red streak, pus)    Negative: SEVERE pain (e.g., excruciating)    Negative: Large swelling or bruise and size > palm of person's hand    Negative: No prior tetanus shots (or is not fully vaccinated) and any wound (e.g., cut or scrape)    Negative: HIV positive or severe immunodeficiency (severely weak immune system) and DIRTY cut or scrape    Negative: Patient wants to be seen    Protocols used: HAND AND WRIST INJURY-A-[place]

## 2023-02-20 ENCOUNTER — VIRTUAL VISIT (OUTPATIENT)
Dept: FAMILY MEDICINE | Facility: CLINIC | Age: 33
End: 2023-02-20
Payer: COMMERCIAL

## 2023-02-20 DIAGNOSIS — J02.9 SORE THROAT: Primary | ICD-10-CM

## 2023-02-20 PROCEDURE — 99213 OFFICE O/P EST LOW 20 MIN: CPT | Mod: VID | Performed by: FAMILY MEDICINE

## 2023-02-20 NOTE — LETTER
February 20, 2023      Nicole Horn  [address] [address]        To Whom It May Concern:      Nicole Horn had a virtual visit today. Kindly excuse her work absence from Feb 15th till Feb 21st 2023.       Let us know if there are any questions.      Thanks      Sincerely,        Vick Oliveira MD

## 2023-02-20 NOTE — NURSING NOTE
"Chief Complaint   Patient presents with     Throat Problem     nitial LMP  (LMP Unknown)  Estimated body mass index is 31.63 kg/m  as calculated from the following:    Height as of 8/15/22: 1.645 m (5' 4.75\").    Weight as of 8/15/22: 85.5 kg (188 lb 9.6 oz).    Patient presents to the clinic using     Is there anyone who you would like to be able to receive your results?   If yes have patient fill out [facility]    "

## 2023-02-20 NOTE — PROGRESS NOTES
Nicole is a 32 year old who is being evaluated via a billable video visit.      How would you like to obtain your AVS? Mail a copy  If the video visit is dropped, the invitation should be resent by: Text to cell phone: [phone]  Will anyone else be joining your video visit? No        Assessment & Plan     Sore throat  Differential discussed in detail including acute pharyngitis, viral etiology.  Rapid strep ordered.  Recommended well hydration, warm fluids, steam inhalation, humidifier use and over-the-counter analgesia.  Suggested to follow-up if symptoms persist or worsen.  Patient understood and in agreement with above plan.  All questions answered.  - Streptococcus A Rapid Screen w/Reflex to PCR - Clinic Collect      Vick Oliveira MD  [facility]    Subjective   Nicole is a 32 year old presenting for the following health issues:  Throat Problem      HPI     Acute Illness  Acute illness concerns: Fatigue  Onset/Duration: 6 days     Has missed work Wed, Thurs, Friday, Sunday and today.  Symptoms:  Fever: YES highest 102.  Yesterday morning last fever.  Chills/Sweats: YES  Headache (location?): YES  Sinus Pressure: YES  Conjunctivitis:  No  Ear Pain: no  Rhinorrhea: No  Congestion: YES  Sore Throat: YES  Cough: YES  Productive with green mucus  Wheeze: YES  Decreased Appetite: YES  Nausea: slight   Vomiting: No  Diarrhea: No  Dysuria/Freq.: No  Dysuria or Hematuria: No  Fatigue/Achiness: YES  Sick/Strep Exposure: No  Therapies tried and outcome: All day relief   advil  400 mg at bedtime      Review of Systems   Constitutional, HEENT, cardiovascular, pulmonary, gi and gu systems are negative, except as otherwise noted.      Objective           Vitals:  No vitals were obtained today due to virtual visit.    Physical Exam   GENERAL: Healthy, alert and no distress  EYES: Eyes grossly normal to inspection.  No discharge or erythema, or obvious scleral/conjunctival abnormalities.  RESP:  No audible wheeze, cough, or visible cyanosis.  No visible retractions or increased work of breathing.    SKIN: Visible skin clear. No significant rash, abnormal pigmentation or lesions.  NEURO: Cranial nerves grossly intact.  Mentation and speech appropriate for age.  PSYCH: Mentation appears normal, affect normal/bright, judgement and insight intact, normal speech and appearance well-groomed.      Video-Visit Details    Type of service:  Video Visit   Video Start Time: 12:15 PM  Video End Time:12:30 PM    Originating Location (pt. Location): Home    Distant Location (provider location):  On-site  Platform used for Video Visit: [employer]